# Patient Record
Sex: FEMALE | Race: BLACK OR AFRICAN AMERICAN | NOT HISPANIC OR LATINO | URBAN - METROPOLITAN AREA
[De-identification: names, ages, dates, MRNs, and addresses within clinical notes are randomized per-mention and may not be internally consistent; named-entity substitution may affect disease eponyms.]

---

## 2018-02-16 ENCOUNTER — INPATIENT (INPATIENT)
Facility: HOSPITAL | Age: 83
LOS: 0 days | Discharge: LEFT BEFORE DISCHARGE | DRG: 305 | End: 2018-02-17
Attending: INTERNAL MEDICINE | Admitting: INTERNAL MEDICINE
Payer: MEDICARE

## 2018-02-16 VITALS
HEART RATE: 79 BPM | SYSTOLIC BLOOD PRESSURE: 178 MMHG | RESPIRATION RATE: 18 BRPM | DIASTOLIC BLOOD PRESSURE: 79 MMHG | TEMPERATURE: 99 F | OXYGEN SATURATION: 94 %

## 2018-02-16 DIAGNOSIS — I25.10 ATHEROSCLEROTIC HEART DISEASE OF NATIVE CORONARY ARTERY WITHOUT ANGINA PECTORIS: ICD-10-CM

## 2018-02-16 DIAGNOSIS — I16.0 HYPERTENSIVE URGENCY: ICD-10-CM

## 2018-02-16 DIAGNOSIS — E11.9 TYPE 2 DIABETES MELLITUS WITHOUT COMPLICATIONS: ICD-10-CM

## 2018-02-16 DIAGNOSIS — R63.8 OTHER SYMPTOMS AND SIGNS CONCERNING FOOD AND FLUID INTAKE: ICD-10-CM

## 2018-02-16 DIAGNOSIS — R10.9 UNSPECIFIED ABDOMINAL PAIN: ICD-10-CM

## 2018-02-16 DIAGNOSIS — R42 DIZZINESS AND GIDDINESS: ICD-10-CM

## 2018-02-16 DIAGNOSIS — R07.9 CHEST PAIN, UNSPECIFIED: ICD-10-CM

## 2018-02-16 DIAGNOSIS — I50.9 HEART FAILURE, UNSPECIFIED: ICD-10-CM

## 2018-02-16 LAB
ALBUMIN SERPL ELPH-MCNC: 3.4 G/DL — SIGNIFICANT CHANGE UP (ref 3.3–5)
ALP SERPL-CCNC: 52 U/L — SIGNIFICANT CHANGE UP (ref 40–120)
ALT FLD-CCNC: 6 U/L — LOW (ref 10–45)
ANION GAP SERPL CALC-SCNC: 15 MMOL/L — SIGNIFICANT CHANGE UP (ref 5–17)
APTT BLD: 35.5 SEC — SIGNIFICANT CHANGE UP (ref 27.5–37.4)
AST SERPL-CCNC: 16 U/L — SIGNIFICANT CHANGE UP (ref 10–40)
BASOPHILS NFR BLD AUTO: 0.8 % — SIGNIFICANT CHANGE UP (ref 0–2)
BILIRUB SERPL-MCNC: 0.4 MG/DL — SIGNIFICANT CHANGE UP (ref 0.2–1.2)
BUN SERPL-MCNC: 13 MG/DL — SIGNIFICANT CHANGE UP (ref 7–23)
CALCIUM SERPL-MCNC: 9 MG/DL — SIGNIFICANT CHANGE UP (ref 8.4–10.5)
CHLORIDE SERPL-SCNC: 101 MMOL/L — SIGNIFICANT CHANGE UP (ref 96–108)
CK MB CFR SERPL CALC: 1.4 NG/ML — SIGNIFICANT CHANGE UP (ref 0–6.7)
CK SERPL-CCNC: 172 U/L — HIGH (ref 25–170)
CO2 SERPL-SCNC: 26 MMOL/L — SIGNIFICANT CHANGE UP (ref 22–31)
CREAT SERPL-MCNC: 0.93 MG/DL — SIGNIFICANT CHANGE UP (ref 0.5–1.3)
EOSINOPHIL NFR BLD AUTO: 2.8 % — SIGNIFICANT CHANGE UP (ref 0–6)
GLUCOSE BLDC GLUCOMTR-MCNC: 117 MG/DL — HIGH (ref 70–99)
GLUCOSE BLDC GLUCOMTR-MCNC: 207 MG/DL — HIGH (ref 70–99)
GLUCOSE SERPL-MCNC: 199 MG/DL — HIGH (ref 70–99)
HCT VFR BLD CALC: 32.6 % — LOW (ref 34.5–45)
HGB BLD-MCNC: 11.5 G/DL — SIGNIFICANT CHANGE UP (ref 11.5–15.5)
INR BLD: 1.02 — SIGNIFICANT CHANGE UP (ref 0.88–1.16)
LYMPHOCYTES # BLD AUTO: 54 % — HIGH (ref 13–44)
MCHC RBC-ENTMCNC: 28.8 PG — SIGNIFICANT CHANGE UP (ref 27–34)
MCHC RBC-ENTMCNC: 35.3 G/DL — SIGNIFICANT CHANGE UP (ref 32–36)
MCV RBC AUTO: 81.5 FL — SIGNIFICANT CHANGE UP (ref 80–100)
MONOCYTES NFR BLD AUTO: 6.8 % — SIGNIFICANT CHANGE UP (ref 2–14)
NEUTROPHILS NFR BLD AUTO: 35.6 % — LOW (ref 43–77)
NT-PROBNP SERPL-SCNC: 230 PG/ML — SIGNIFICANT CHANGE UP (ref 0–300)
PLATELET # BLD AUTO: 174 K/UL — SIGNIFICANT CHANGE UP (ref 150–400)
POTASSIUM SERPL-MCNC: 3.7 MMOL/L — SIGNIFICANT CHANGE UP (ref 3.5–5.3)
POTASSIUM SERPL-SCNC: 3.7 MMOL/L — SIGNIFICANT CHANGE UP (ref 3.5–5.3)
PROT SERPL-MCNC: 7.5 G/DL — SIGNIFICANT CHANGE UP (ref 6–8.3)
PROTHROM AB SERPL-ACNC: 11.3 SEC — SIGNIFICANT CHANGE UP (ref 9.8–12.7)
RBC # BLD: 4 M/UL — SIGNIFICANT CHANGE UP (ref 3.8–5.2)
RBC # FLD: 13.9 % — SIGNIFICANT CHANGE UP (ref 10.3–16.9)
SODIUM SERPL-SCNC: 142 MMOL/L — SIGNIFICANT CHANGE UP (ref 135–145)
TROPONIN T SERPL-MCNC: <0.01 NG/ML — SIGNIFICANT CHANGE UP (ref 0–0.01)
TROPONIN T SERPL-MCNC: <0.01 NG/ML — SIGNIFICANT CHANGE UP (ref 0–0.01)
WBC # BLD: 6.5 K/UL — SIGNIFICANT CHANGE UP (ref 3.8–10.5)
WBC # FLD AUTO: 6.5 K/UL — SIGNIFICANT CHANGE UP (ref 3.8–10.5)

## 2018-02-16 PROCEDURE — 99285 EMERGENCY DEPT VISIT HI MDM: CPT | Mod: 25

## 2018-02-16 PROCEDURE — 71045 X-RAY EXAM CHEST 1 VIEW: CPT | Mod: 26

## 2018-02-16 PROCEDURE — 93010 ELECTROCARDIOGRAM REPORT: CPT

## 2018-02-16 PROCEDURE — 70450 CT HEAD/BRAIN W/O DYE: CPT | Mod: 26

## 2018-02-16 RX ORDER — SODIUM CHLORIDE 9 MG/ML
1000 INJECTION, SOLUTION INTRAVENOUS
Qty: 0 | Refills: 0 | Status: DISCONTINUED | OUTPATIENT
Start: 2018-02-16 | End: 2018-02-17

## 2018-02-16 RX ORDER — FUROSEMIDE 40 MG
40 TABLET ORAL DAILY
Qty: 0 | Refills: 0 | Status: DISCONTINUED | OUTPATIENT
Start: 2018-02-16 | End: 2018-02-17

## 2018-02-16 RX ORDER — DEXTROSE 50 % IN WATER 50 %
12.5 SYRINGE (ML) INTRAVENOUS ONCE
Qty: 0 | Refills: 0 | Status: DISCONTINUED | OUTPATIENT
Start: 2018-02-16 | End: 2018-02-17

## 2018-02-16 RX ORDER — DEXTROSE 50 % IN WATER 50 %
1 SYRINGE (ML) INTRAVENOUS ONCE
Qty: 0 | Refills: 0 | Status: DISCONTINUED | OUTPATIENT
Start: 2018-02-16 | End: 2018-02-17

## 2018-02-16 RX ORDER — INSULIN LISPRO 100/ML
VIAL (ML) SUBCUTANEOUS EVERY 6 HOURS
Qty: 0 | Refills: 0 | Status: DISCONTINUED | OUTPATIENT
Start: 2018-02-16 | End: 2018-02-17

## 2018-02-16 RX ORDER — HEPARIN SODIUM 5000 [USP'U]/ML
5000 INJECTION INTRAVENOUS; SUBCUTANEOUS EVERY 8 HOURS
Qty: 0 | Refills: 0 | Status: DISCONTINUED | OUTPATIENT
Start: 2018-02-16 | End: 2018-02-16

## 2018-02-16 RX ORDER — HEPARIN SODIUM 5000 [USP'U]/ML
7500 INJECTION INTRAVENOUS; SUBCUTANEOUS EVERY 8 HOURS
Qty: 0 | Refills: 0 | Status: DISCONTINUED | OUTPATIENT
Start: 2018-02-16 | End: 2018-02-17

## 2018-02-16 RX ORDER — INFLUENZA VIRUS VACCINE 15; 15; 15; 15 UG/.5ML; UG/.5ML; UG/.5ML; UG/.5ML
0.5 SUSPENSION INTRAMUSCULAR ONCE
Qty: 0 | Refills: 0 | Status: COMPLETED | OUTPATIENT
Start: 2018-02-16 | End: 2018-02-16

## 2018-02-16 RX ORDER — METOPROLOL TARTRATE 50 MG
25 TABLET ORAL
Qty: 0 | Refills: 0 | Status: DISCONTINUED | OUTPATIENT
Start: 2018-02-16 | End: 2018-02-17

## 2018-02-16 RX ORDER — NYSTATIN CREAM 100000 [USP'U]/G
1 CREAM TOPICAL DAILY
Qty: 0 | Refills: 0 | Status: DISCONTINUED | OUTPATIENT
Start: 2018-02-16 | End: 2018-02-17

## 2018-02-16 RX ORDER — ACETAMINOPHEN 500 MG
650 TABLET ORAL EVERY 6 HOURS
Qty: 0 | Refills: 0 | Status: DISCONTINUED | OUTPATIENT
Start: 2018-02-16 | End: 2018-02-17

## 2018-02-16 RX ORDER — SODIUM CHLORIDE 9 MG/ML
1000 INJECTION INTRAMUSCULAR; INTRAVENOUS; SUBCUTANEOUS
Qty: 0 | Refills: 0 | Status: DISCONTINUED | OUTPATIENT
Start: 2018-02-16 | End: 2018-02-16

## 2018-02-16 RX ORDER — LABETALOL HCL 100 MG
2.5 TABLET ORAL ONCE
Qty: 0 | Refills: 0 | Status: COMPLETED | OUTPATIENT
Start: 2018-02-16 | End: 2018-02-16

## 2018-02-16 RX ORDER — DEXTROSE 50 % IN WATER 50 %
25 SYRINGE (ML) INTRAVENOUS ONCE
Qty: 0 | Refills: 0 | Status: DISCONTINUED | OUTPATIENT
Start: 2018-02-16 | End: 2018-02-17

## 2018-02-16 RX ORDER — DIPHENHYDRAMINE HCL 50 MG
12.5 CAPSULE ORAL ONCE
Qty: 0 | Refills: 0 | Status: DISCONTINUED | OUTPATIENT
Start: 2018-02-16 | End: 2018-02-16

## 2018-02-16 RX ORDER — ISOSORBIDE MONONITRATE 60 MG/1
30 TABLET, EXTENDED RELEASE ORAL DAILY
Qty: 0 | Refills: 0 | Status: DISCONTINUED | OUTPATIENT
Start: 2018-02-16 | End: 2018-02-16

## 2018-02-16 RX ORDER — ISOSORBIDE MONONITRATE 60 MG/1
30 TABLET, EXTENDED RELEASE ORAL DAILY
Qty: 0 | Refills: 0 | Status: DISCONTINUED | OUTPATIENT
Start: 2018-02-16 | End: 2018-02-17

## 2018-02-16 RX ORDER — GLUCAGON INJECTION, SOLUTION 0.5 MG/.1ML
1 INJECTION, SOLUTION SUBCUTANEOUS ONCE
Qty: 0 | Refills: 0 | Status: DISCONTINUED | OUTPATIENT
Start: 2018-02-16 | End: 2018-02-17

## 2018-02-16 RX ORDER — LABETALOL HCL 100 MG
5 TABLET ORAL ONCE
Qty: 0 | Refills: 0 | Status: COMPLETED | OUTPATIENT
Start: 2018-02-16 | End: 2018-02-16

## 2018-02-16 RX ADMIN — Medication 40 MILLIGRAM(S): at 17:23

## 2018-02-16 RX ADMIN — Medication 4: at 23:45

## 2018-02-16 RX ADMIN — SODIUM CHLORIDE 125 MILLILITER(S): 9 INJECTION INTRAMUSCULAR; INTRAVENOUS; SUBCUTANEOUS at 14:01

## 2018-02-16 RX ADMIN — Medication 25 MILLIGRAM(S): at 17:23

## 2018-02-16 RX ADMIN — ISOSORBIDE MONONITRATE 30 MILLIGRAM(S): 60 TABLET, EXTENDED RELEASE ORAL at 17:23

## 2018-02-16 RX ADMIN — Medication 2.5 MILLIGRAM(S): at 14:56

## 2018-02-16 NOTE — H&P ADULT - PROBLEM SELECTOR PLAN 6
-no IVFs  - -BP elevated to 200s on admission, patient with crackles on exam, possibly dizzy w/ chest pain 2/2 hypertensive urgency  -lower BP 25% in 24 hrs  -c/w home isosorbide mononitrate for now (30mg)  -c/w metoprolol tartrate 25mg BID daily

## 2018-02-16 NOTE — ED PROVIDER NOTE - MEDICAL DECISION MAKING DETAILS
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 ACS

## 2018-02-16 NOTE — H&P ADULT - PROBLEM SELECTOR PLAN 5
-BP elevated to 200s on admission, patient with crackles on exam, possibly dizzy w/ chest pain 2/2 hypertensive urgency  -lower BP 25% in 24 hrs  -c/w home isosorbide mononitrate for now (30mg)  -c/w metoprolol tartrate 25mg BID daily -FSG in 100s  -f/u HbA1c  -Humulin at home  -SSI for now -nonspecific lower quadrant abdominal pain which per patient is worse with urination  -f/u abdominal xray, UA  -UCx if UA positive, start abx  -consider renal US if concern for pyelo

## 2018-02-16 NOTE — H&P ADULT - ASSESSMENT
82F PMH CVA 4 yrs ago (residual leg weakness), DM2, HTN, CAD 3VD no stents, CHF, chronic UTI (per patient) presented to [facility] with dizziness that began at 12AM last night and on and off left-sided chest pain 8/10 that comes and goes, r/o CHF exacerbation vs flash pulm edema 2/2 hypertensive urgency vs emergency.

## 2018-02-16 NOTE — H&P ADULT - NSHPSOCIALHISTORY_GEN_ALL_CORE
Denies tobacco use  Occasional alcohol use  Denies illicit drug use  Lives at home alone, uses wheelchair

## 2018-02-16 NOTE — H&P ADULT - PROBLEM SELECTOR PLAN 2
-CT head negative in ED  -patient with positional dizziness and associated headache in the setting of elevated BPs to 200s. Likely 2/2 hypertensive urgency.  -lower BP 25% in 24 hours, home isosorbide dinitrate and metoprolol tartrate 25 BID resumed.

## 2018-02-16 NOTE — ED PROVIDER NOTE - CARE PLAN
Principal Discharge DX:	Chest pain  Secondary Diagnosis:	CAD (coronary artery disease)  Secondary Diagnosis:	DM (diabetes mellitus)

## 2018-02-16 NOTE — H&P ADULT - NSHPPHYSICALEXAM_GEN_ALL_CORE
VITAL SIGNS:  ICU Vital Signs Last 24 Hrs  T(C): 36.8 (16 Feb 2018 14:36), Max: 37.1 (16 Feb 2018 11:22)  T(F): 98.2 (16 Feb 2018 14:36), Max: 98.8 (16 Feb 2018 11:22)  HR: 74 (16 Feb 2018 14:36) (74 - 79)  BP: 240/107 (16 Feb 2018 14:36) (178/79 - 240/107)  BP(mean): --  ABP: --  ABP(mean): --  RR: 18 (16 Feb 2018 14:36) (18 - 18)  SpO2: 95% (16 Feb 2018 14:36) (94% - 95%)      POCT Blood Glucose.: 117 mg/dL (16 Feb 2018 15:29)      PHYSICAL EXAM:  General: NAD  HEENT: NC/AT; PERRL, clear conjunctiva  Neck: supple  Respiratory: CTA b/l  Cardiovascular: +S1/S2; RRR  Abdomen: soft, NT/ND; +BS x4  Extremities: WWP, 2+ peripheral pulses b/l; no LE edema  Skin: normal color and turgor; no rash  Neurological: VITAL SIGNS:  ICU Vital Signs Last 24 Hrs  T(C): 36.8 (16 Feb 2018 14:36), Max: 37.1 (16 Feb 2018 11:22)  T(F): 98.2 (16 Feb 2018 14:36), Max: 98.8 (16 Feb 2018 11:22)  HR: 74 (16 Feb 2018 14:36) (74 - 79)  BP: 240/107 (16 Feb 2018 14:36) (178/79 - 240/107)  BP(mean): --  ABP: --  ABP(mean): --  RR: 18 (16 Feb 2018 14:36) (18 - 18)  SpO2: 95% (16 Feb 2018 14:36) (94% - 95%)      POCT Blood Glucose.: 117 mg/dL (16 Feb 2018 15:29)      PHYSICAL EXAM:  General: NAD, resting comfortably in bed  HEENT: NC/AT; PERRL, clear conjunctiva, EOMI, MMM  Neck: supple, no JVD or LAD  Respiratory: b/l crackles, no wheezing  Cardiovascular: +S1/S2; RRR, no murmurs  Abdomen: soft, NT/ND; +BS x4  Extremities: WWP, b/l LE edema  Skin: normal color and turgor; no rash  Neurological: AOX3 VITAL SIGNS:  ICU Vital Signs Last 24 Hrs  T(C): 36.8 (16 Feb 2018 14:36), Max: 37.1 (16 Feb 2018 11:22)  T(F): 98.2 (16 Feb 2018 14:36), Max: 98.8 (16 Feb 2018 11:22)  HR: 74 (16 Feb 2018 14:36) (74 - 79)  BP: 240/107 (16 Feb 2018 14:36) (178/79 - 240/107)  BP(mean): --  ABP: --  ABP(mean): --  RR: 18 (16 Feb 2018 14:36) (18 - 18)  SpO2: 95% (16 Feb 2018 14:36) (94% - 95%)      POCT Blood Glucose.: 117 mg/dL (16 Feb 2018 15:29)      PHYSICAL EXAM:  General: NAD, resting comfortably in bed  HEENT: NC/AT; PERRL, clear conjunctiva, EOMI, MMM  Neck: supple, no JVD or LAD  Respiratory: b/l crackles, no wheezing  Cardiovascular: +S1/S2; RRR, no murmurs  Abdomen: soft, diffusely tender in lower quadrants, ND; +BS x4  Extremities: WWP, b/l LE edema  Skin: normal color and turgor; no rash  Neurological: AOX3

## 2018-02-16 NOTE — H&P ADULT - HISTORY OF PRESENT ILLNESS
82 F bibems with hx of prior CVA 4 years ago-hx of DM/ CAD ? triple vessel disease? on ASA Plavix HTN elevated cholesterol with dizziness since midnight with left sided arm pain and chest pain-  no headache or visual complaints  no back pain- but c/o of pain in left arm intermittently since last pm - no prior hx of MI or PE  no leg pain or calf tenderness - uses a walker  min ambulatory no cough or fevers or chills or muscle aches- no flu -like sx - no flu shot this year 82F PMH CVA 4 yrs ago (residual leg weakness), DM2, HTN, CAD 3VD 82F PMH CVA 4 yrs ago (residual leg weakness), DM2, HTN, CAD 3VD no stents, CHF, chronic UTI (per patient) presented to [facility] with dizziness that began at 12AM last night and on and off left-sided chest pain 8/10 that comes and goes. Per patient, unsure if chest pain radiates to l shouder / neck, as she has arthritis at these sites as well. Describes patient as "hurting", unable to characterize further other than that it feels like indigestion. Vomited once last night, denies nausea currently. Tried to relieve with tylenol, mild relief. Denies fevers, diarrhea, constipation, no recent sick contacts. Dizziness that began last night is associated with headache, seems to be positional. Per patient, no LOC or falls. Does not feel as though the room is spinning around her. 82F PMH CVA 4 yrs ago (residual leg weakness), DM2, HTN, CAD 3VD no stents, CHF, chronic UTI (per patient) presented to [facility] with dizziness that began at 12AM last night and on and off left-sided chest pain 8/10 that comes and goes. Per patient, unsure if chest pain radiates to l shouder / neck, as she has arthritis at these sites as well. Describes patient as "hurting", unable to characterize further other than that it feels like indigestion. Vomited once last night, denies nausea currently. Tried to relieve with tylenol, mild relief. Denies fevers, diarrhea, constipation, no recent sick contacts. Dizziness that began last night is associated with headache, seems to be positional. Per patient, no LOC or falls. Does not feel as though the room is spinning around her.  Additonally reports abdominal pain which she attributes to untreated UTI.  In the ED, patient was hypertensive to 240s, given IV labetalol pushes X 2. EKG with no ischemic changes. Troponin negative X 1.

## 2018-02-16 NOTE — H&P ADULT - ATTENDING COMMENTS
-pt seen and examined  A/Atypical Chest pain     Malignant hypertension     Vague Complaints of Dizziness                -refuses medication adjustment this am, and insists on going home. Risks explained in detail, and verbalizes understanding. Advised to                 followup with her PCP, ruled out for ACS.

## 2018-02-16 NOTE — H&P ADULT - NSHPLABSRESULTS_GEN_ALL_CORE
LABS:                        11.5   6.5   )-----------( 174      ( 16 Feb 2018 12:32 )             32.6     02-16    142  |  101  |  13  ----------------------------<  199<H>  3.7   |  26  |  0.93    Ca    9.0      16 Feb 2018 12:32    TPro  7.5  /  Alb  3.4  /  TBili  0.4  /  DBili  x   /  AST  16  /  ALT  6<L>  /  AlkPhos  52  02-16    PT/INR - ( 16 Feb 2018 12:32 )   PT: 11.3 sec;   INR: 1.02          PTT - ( 16 Feb 2018 12:32 )  PTT:35.5 sec      RADIOLOGY & ADDITIONAL TESTS: Reviewed.

## 2018-02-16 NOTE — ED PROVIDER NOTE - OBJECTIVE STATEMENT
82 F bibems with hx of prior CVA 4 years ago-hx of DM/ CAD ? triple vessel disease? on ASA Plavix HTN elevated cholesterol with dizziness since midnight with left sided arm pain and chest pain-  no headache or visual complaints  no back pain- but c/o of pain in left arm intermittently since last pm - no prior hx of MI or PE  no leg pain or calf tenderness - uses a walker  min ambulatory no cough or fevers or chills or muscle aches- no flu -like sx - no flu shot this year

## 2018-02-16 NOTE — ED ADULT NURSE NOTE - OTHER COMPLAINTS
Patient reports shortness of breath last night, denies shortness of breath at present time. Speaking in full sentences. Patient denies any chest pain.

## 2018-02-16 NOTE — H&P ADULT - PROBLEM SELECTOR PLAN 7
-no IVFs  -Consistent carbohydrate/DASH diet -FSG in 100s  -f/u HbA1c  -Humulin at home  -SSI for now

## 2018-02-16 NOTE — H&P ADULT - PMH
CAD (coronary artery disease)    CKD (chronic kidney disease) stage 3, GFR 30-59 ml/min    CVA (cerebral vascular accident)    Diabetes mellitus    Hypertension

## 2018-02-16 NOTE — H&P ADULT - PROBLEM SELECTOR PLAN 1
-patient with intermittent chest pain non-radiating, presenting with negative trops X1, EKG w/o ischemic changes  -r/o ACS- f/u troponin -patient with intermittent chest pain non-radiating, presenting with negative trops X1, EKG w/o ischemic changes, r.o ACS vs CHF exacerbation vs hypertensive urgency  -r/o ACS- f/u troponin  -EKG in AM -patient with intermittent chest pain non-radiating, presenting with negative trops X1, EKG w/o ischemic changes, r.o ACS vs CHF exacerbation vs hypertensive urgency. CXR with pulm vascular congestion.  -r/o ACS- troponin negative X 2  -EKG in AM  -echo in AM

## 2018-02-16 NOTE — ED ADULT NURSE NOTE - OBJECTIVE STATEMENT
Patient unable to describe reason for coming in other than "not feeling well."  Denies chest pain and dyspnea at current time.  Patient also reports she was mad at her doctor because she thinks she has UTI but "he wouldn't given me antibiotics so Im not going to see him again."  EKG performed upon arrival.  Provider to evaluate.

## 2018-02-17 VITALS — TEMPERATURE: 99 F

## 2018-02-17 LAB
GLUCOSE BLDC GLUCOMTR-MCNC: 188 MG/DL — HIGH (ref 70–99)
GLUCOSE BLDC GLUCOMTR-MCNC: 232 MG/DL — HIGH (ref 70–99)
GLUCOSE BLDC GLUCOMTR-MCNC: 253 MG/DL — HIGH (ref 70–99)

## 2018-02-17 PROCEDURE — 80053 COMPREHEN METABOLIC PANEL: CPT

## 2018-02-17 PROCEDURE — 83880 ASSAY OF NATRIURETIC PEPTIDE: CPT

## 2018-02-17 PROCEDURE — 85730 THROMBOPLASTIN TIME PARTIAL: CPT

## 2018-02-17 PROCEDURE — 84484 ASSAY OF TROPONIN QUANT: CPT

## 2018-02-17 PROCEDURE — 99223 1ST HOSP IP/OBS HIGH 75: CPT

## 2018-02-17 PROCEDURE — 85610 PROTHROMBIN TIME: CPT

## 2018-02-17 PROCEDURE — 36415 COLL VENOUS BLD VENIPUNCTURE: CPT

## 2018-02-17 PROCEDURE — 85025 COMPLETE CBC W/AUTO DIFF WBC: CPT

## 2018-02-17 PROCEDURE — 93005 ELECTROCARDIOGRAM TRACING: CPT

## 2018-02-17 PROCEDURE — 82962 GLUCOSE BLOOD TEST: CPT

## 2018-02-17 PROCEDURE — 99285 EMERGENCY DEPT VISIT HI MDM: CPT | Mod: 25

## 2018-02-17 PROCEDURE — 82553 CREATINE MB FRACTION: CPT

## 2018-02-17 PROCEDURE — 82550 ASSAY OF CK (CPK): CPT

## 2018-02-17 PROCEDURE — 71045 X-RAY EXAM CHEST 1 VIEW: CPT

## 2018-02-17 PROCEDURE — 70450 CT HEAD/BRAIN W/O DYE: CPT

## 2018-02-17 RX ORDER — METOPROLOL TARTRATE 50 MG
1 TABLET ORAL
Qty: 0 | Refills: 0 | COMMUNITY

## 2018-02-17 RX ORDER — ASPIRIN/CALCIUM CARB/MAGNESIUM 324 MG
1 TABLET ORAL
Qty: 0 | Refills: 0 | COMMUNITY

## 2018-02-17 RX ORDER — ISOSORBIDE DINITRATE 5 MG/1
1 TABLET ORAL
Qty: 0 | Refills: 0 | COMMUNITY

## 2018-02-17 RX ORDER — FUROSEMIDE 40 MG
1 TABLET ORAL
Qty: 0 | Refills: 0 | COMMUNITY

## 2018-02-17 RX ORDER — HYDRALAZINE HCL 50 MG
25 TABLET ORAL ONCE
Qty: 0 | Refills: 0 | Status: DISCONTINUED | OUTPATIENT
Start: 2018-02-17 | End: 2018-02-17

## 2018-02-17 RX ORDER — ROSUVASTATIN CALCIUM 5 MG/1
1 TABLET ORAL
Qty: 0 | Refills: 0 | COMMUNITY

## 2018-02-17 RX ORDER — HYDRALAZINE HCL 50 MG
10 TABLET ORAL ONCE
Qty: 0 | Refills: 0 | Status: DISCONTINUED | OUTPATIENT
Start: 2018-02-17 | End: 2018-02-17

## 2018-02-17 RX ORDER — CLOPIDOGREL BISULFATE 75 MG/1
1 TABLET, FILM COATED ORAL
Qty: 0 | Refills: 0 | COMMUNITY

## 2018-02-17 RX ADMIN — Medication 25 MILLIGRAM(S): at 07:22

## 2018-02-17 RX ADMIN — NYSTATIN CREAM 1 APPLICATION(S): 100000 CREAM TOPICAL at 13:17
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
## 2018-02-17 NOTE — CHART NOTE - NSCHARTNOTEFT_GEN_A_CORE
Cardiology NP Event Note:    Pt seen and examined this morning. States that she was admitted to the hospital for complaints of dizziness while getting out of bed. Reports that after dizziness preceded with nausea and vomiting x1. c/o atypical chest pain located left-sided, occurring intermittently. Cp radiates to left shoulder and neck, as she has arthritis at these sites. In the ED, SBP 240s and received IV Labetalol x 2. Trop negative <0.01 x 2. . Pt admitted to [facility] for chest pain and Hypertensive urgency.   While on [facility], SBP has been ranging from 180s-210s. Pt states that she takes her medication, Metoprolol Tartrate, everyday but unsure what her BP is at home. Pt has been refusing to taking any other BP medication except her home medication. When provider explained to the pt the importance of keeping BP better control and the risk of uncontrolled BP (heart attack, stroke, kidney disease, heart failure, PAD, vision problems, etc), pt continues to refuse medications to lower BP. Pt states that "we are not giving her medications to help lower her blood pressure and blood sugar". Cardiology NP Event Note:    Pt seen and examined this morning. States that she was admitted to the hospital for complaints of dizziness while getting out of bed. Reports that after dizziness preceded with nausea and vomiting x1. c/o atypical chest pain located left-sided, occurring intermittently. Cp radiates to left shoulder and neck, as she has arthritis at these sites. In the ED, SBP 240s and received IV Labetalol x 2. Trop negative <0.01 x 2. . Pt admitted to [facility] for chest pain and Hypertensive urgency.   While on [facility], SBP has been ranging from 180s-210s. Pt states that she takes her medication, Metoprolol Tartrate, everyday but unsure what her BP is at home. Pt has been refusing to taking any other BP medication except her home medication. When provider explained to the pt the importance of keeping BP better control and the risk of uncontrolled BP (heart attack, stroke, kidney disease, heart failure, PAD, vision problems, etc), pt continues to refuse medications to lower BP. Pt states that "we are not giving her medications to help lower her blood pressure and blood sugar". Re-explained to the pt that the BP is not well controlled with her current regimen and wanted to add additional agents. Pt refused to take additional medications. Pt states that she wants to leave the hospital today. Explained to the pt that she would have to leave against medical advice-refused to sign the paperwork.     Discussed with Dr. Lagunas with Cardiology NP Event Note:    Pt seen and examined this morning. States that she was admitted to the hospital for complaints of dizziness while getting out of bed. Reports that after dizziness preceded with nausea and vomiting x1. c/o atypical chest pain located left-sided, occurring intermittently. Cp radiates to left shoulder and neck, as she has arthritis at these sites as well. In the ED, SBP 240s and received IV Labetalol x 2. Trop negative <0.01 x 2. . EKG with TWI in aVL, no acute ischemic changes. Pt admitted to [facility] for chest pain and Hypertensive urgency.   While on [facility], SBP has been ranging from 180s-210s. Pt states that she takes her medication, Metoprolol Tartrate, everyday but unsure what her BP is at home. Pt has been refusing to taking any other BP medication except her home medication. When provider explained to the pt the importance of keeping BP better control and the risk of uncontrolled BP (heart attack, stroke, kidney disease, heart failure, PAD, vision problems, etc), pt continues to refuse medications to lower BP. Pt states that "we are not giving her medications to help lower her blood pressure and blood sugar". Re-explained to the pt that the BP is not well controlled with her current regimen and wanted to add additional agents. Pt refused to take additional medications. Pt states that she wants to leave the hospital today. Explained to the pt that she would have to leave against medical advice-refused to sign the paperwork.     Discussed the plan with Dr. Lagunas. Aware that pt is refusing to take medications to help control blood pressure and wants to leave the hospital. Pt is awaiting for her son to pick her up to go home.     Vital Signs Last 24 Hrs  T(C): 37.1 (17 Feb 2018 12:20), Max: 37.3 (16 Feb 2018 17:38)  T(F): 98.7 (17 Feb 2018 12:20), Max: 99.1 (16 Feb 2018 17:38)  HR: 82 (17 Feb 2018 05:00) (70 - 86)  BP: 196/79 (17 Feb 2018 04:08) (134/81 - 240/107)  BP(mean): 120 (17 Feb 2018 04:08) (103 - 163)  RR: 18 (17 Feb 2018 04:08) (12 - 18)  SpO2: 96% (17 Feb 2018 04:08) (94% - 97%)    Physical Exam:  General: NAD, resting comfortably  Neuro: alert and oriented x 3, no focal deficits   Heart: S1 S2 noted, no murmur, rub or gallop   Lung: B CTA, no wheezing, rales or rhonchi   Abdomen: soft, non-tender, non-distented, BS active; obese female   Extremities: warm to touch, no lower extremity edema noted  Skin: Left shin wound-ecchymotic, no drainage, erythema Cardiology NP Event Note:    Pt seen and examined this morning. States that she was admitted to the hospital for complaints of dizziness while getting out of bed. Reports that after dizziness preceded with nausea and vomiting x1. c/o atypical chest pain located left-sided, occurring intermittently. Cp radiates to left shoulder and neck, as she has arthritis at these sites as well. In the ED, SBP 240s and received IV Labetalol x 2. Trop negative <0.01 x 2. . EKG with TWI in aVL, no acute ischemic changes. Pt admitted to [facility] for chest pain and Hypertensive urgency.   While on [facility], SBP has been ranging from 180s-210s. Pt states that she takes her medication, Metoprolol Tartrate, everyday but unsure what her BP is at home. Pt has been refusing to taking any other BP medication except her home medication. When provider explained to the pt the importance of keeping BP better control and the risk of uncontrolled BP (heart attack, stroke, kidney disease, heart failure, PAD, vision problems, etc), pt continues to refuse medications to lower BP. Pt states that "we are not giving her medications to help lower her blood pressure and blood sugar". Re-explained to the pt that the BP is not well controlled with her current regimen and wanted to add additional agents. Pt refused to take additional medications. Pt states that she wants to leave the hospital today. Explained to the pt that she would have to leave against medical advice-refused to sign the paperwork. Advice pt to follow up with her PMD for continued management and monitoring of BP and r/o ACS.     Discussed the plan with Dr. Lagunas. Aware that pt is refusing to take medications to help control blood pressure and wants to leave the hospital. Pt is awaiting for her son to pick her up to go home.     Vital Signs Last 24 Hrs  T(C): 37.1 (17 Feb 2018 12:20), Max: 37.3 (16 Feb 2018 17:38)  T(F): 98.7 (17 Feb 2018 12:20), Max: 99.1 (16 Feb 2018 17:38)  HR: 82 (17 Feb 2018 05:00) (70 - 86)  BP: 196/79 (17 Feb 2018 04:08) (134/81 - 240/107)  BP(mean): 120 (17 Feb 2018 04:08) (103 - 163)  RR: 18 (17 Feb 2018 04:08) (12 - 18)  SpO2: 96% (17 Feb 2018 04:08) (94% - 97%)    Physical Exam:  General: NAD, resting comfortably  Neuro: alert and oriented x 3, no focal deficits   Heart: S1 S2 noted, no murmur, rub or gallop   Lung: B CTA, no wheezing, rales or rhonchi   Abdomen: soft, non-tender, non-distented, BS active; obese female   Extremities: warm to touch, no lower extremity edema noted  Skin: Left shin wound-ecchymotic, no drainage, erythema

## 2018-02-21 DIAGNOSIS — R07.9 CHEST PAIN, UNSPECIFIED: ICD-10-CM

## 2018-02-21 DIAGNOSIS — Z79.84 LONG TERM (CURRENT) USE OF ORAL HYPOGLYCEMIC DRUGS: ICD-10-CM

## 2018-02-21 DIAGNOSIS — E11.22 TYPE 2 DIABETES MELLITUS WITH DIABETIC CHRONIC KIDNEY DISEASE: ICD-10-CM

## 2018-02-21 DIAGNOSIS — I16.0 HYPERTENSIVE URGENCY: ICD-10-CM

## 2018-02-21 DIAGNOSIS — I25.10 ATHEROSCLEROTIC HEART DISEASE OF NATIVE CORONARY ARTERY WITHOUT ANGINA PECTORIS: ICD-10-CM

## 2018-02-21 DIAGNOSIS — I50.9 HEART FAILURE, UNSPECIFIED: ICD-10-CM

## 2018-02-21 DIAGNOSIS — N18.3 CHRONIC KIDNEY DISEASE, STAGE 3 (MODERATE): ICD-10-CM

## 2018-02-21 DIAGNOSIS — I69.349 MONOPLEGIA OF LOWER LIMB FOLLOWING CEREBRAL INFARCTION AFFECTING UNSPECIFIED SIDE: ICD-10-CM

## 2018-02-21 DIAGNOSIS — I13.0 HYPERTENSIVE HEART AND CHRONIC KIDNEY DISEASE WITH HEART FAILURE AND STAGE 1 THROUGH STAGE 4 CHRONIC KIDNEY DISEASE, OR UNSPECIFIED CHRONIC KIDNEY DISEASE: ICD-10-CM

## 2023-04-11 NOTE — PATIENT PROFILE ADULT. - PRO SERVICES AT [facility]
-The pt is at an acceptable risk from a cardiac perspective for upcoming non cardiac procedure (lipoma removal). OK to hold Xarelto 2-3 days before and restart after when safe from a bleeding perspective.    
none

## 2023-05-28 NOTE — ED PROVIDER NOTE - TOBACCO USE
Patient arrived to bed 5308-1 at 0235 from ED. Patient A&Ox4, vitals stable. Patient oriented to room and call light system. Steady gait. Plan for pain management. Plan of care ongoing.       Problem: Pain  Goal: #Acceptable pain level achieved/maintained at rest using NRS/Faces  Description: This goal is used for patients who can self-report.  Acceptable means the level is at or below the identified comfort/function goal.  Outcome: Outcome Not Met, Continue to Monitor  Goal: # Acceptable pain level achieved/maintained with activity using NRS/Faces  Description: This goal is used for patients who can self-report and are not achieving acceptable pain control during activity.  Outcome: Outcome Not Met, Continue to Monitor  Goal: Acceptable pain/comfort level is achieved/maintained at rest (based on Pain Behaviors Scale)  Description: This goal is used for patients who are not able to self-report pain and are assessed for pain using the Pain Behaviors Scale  Outcome: Outcome Not Met, Continue to Monitor  Goal: # Verbalizes understanding of pain management  Description: Documented in Patient Education Activity  Outcome: Outcome Not Met, Continue to Monitor      Never smoker

## 2024-11-14 NOTE — ED ADULT TRIAGE NOTE - OTHER COMPLAINTS
Patient reports shortness of breath last night, denies shortness of breath at present time. Speaking in full sentences. Patient denies any chest pain. intact